# Patient Record
Sex: FEMALE | Race: BLACK OR AFRICAN AMERICAN | NOT HISPANIC OR LATINO | URBAN - METROPOLITAN AREA
[De-identification: names, ages, dates, MRNs, and addresses within clinical notes are randomized per-mention and may not be internally consistent; named-entity substitution may affect disease eponyms.]

---

## 2019-02-06 ENCOUNTER — EMERGENCY (EMERGENCY)
Facility: HOSPITAL | Age: 39
LOS: 1 days | Discharge: HOME | End: 2019-02-06
Attending: EMERGENCY MEDICINE | Admitting: EMERGENCY MEDICINE
Payer: MEDICAID

## 2019-02-06 VITALS
OXYGEN SATURATION: 100 % | DIASTOLIC BLOOD PRESSURE: 96 MMHG | SYSTOLIC BLOOD PRESSURE: 139 MMHG | HEART RATE: 88 BPM | TEMPERATURE: 98 F | RESPIRATION RATE: 16 BRPM

## 2019-02-06 PROCEDURE — 99283 EMERGENCY DEPT VISIT LOW MDM: CPT

## 2019-02-06 PROCEDURE — 71046 X-RAY EXAM CHEST 2 VIEWS: CPT | Mod: 26

## 2019-02-06 RX ORDER — IBUPROFEN 200 MG
600 TABLET ORAL ONCE
Qty: 0 | Refills: 0 | Status: COMPLETED | OUTPATIENT
Start: 2019-02-06 | End: 2019-02-06

## 2019-02-06 RX ADMIN — Medication 600 MILLIGRAM(S): at 16:39

## 2019-02-06 NOTE — ED ADULT TRIAGE NOTE - CHIEF COMPLAINT QUOTE
pt amb to triage c/o flu-like symptoms x 3 days, fever, chills, HA, weakness and body aches, received flu-shot this season, + sick contacts, mask placed on pt in triage

## 2019-02-06 NOTE — ED PROVIDER NOTE - OBJECTIVE STATEMENT
38F no pmh presents with fever/chills and cough since Saturday night. 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 Cough productive of yellow and white sputum. Denies abdominal pain, n/v, diarrhea, constipation, back pain, urinary symptoms, cp, sob. of note, 1 episode of bronchitis is the past.

## 2019-02-06 NOTE — ED PROVIDER NOTE - NSFOLLOWUPINSTRUCTIONS_ED_ALL_ED_FT
1) You were seen in the ER for cough. The patient has been informed of all concerning signs and symptoms to return to Emergency Department, the necessity to follow up with PMD/Clinic/follow up provided within 2-3 days was explained, and the patient reports understanding of above with capacity and insight. You can look at the discharge papers for some examples of specific signs and symptoms to look out for.  2. Please take tessalon perles for coughing.   3) Take motrin 600mg every 6 hours as needed for pain

## 2019-02-06 NOTE — ED PROVIDER NOTE - MEDICAL DECISION MAKING DETAILS
38F no pmh presents with fever/chills and cough since Saturday night. cxr to r/o pna, motrin for pain, poct pregnancy.

## 2019-02-06 NOTE — ED PROVIDER NOTE - NS ED ROS FT
Constitutional: no fevers, +chills.  Eyes: no visual changes.  Ears: no ear drainage, no ear pain.  Nose: no nasal congestion.  Mouth/Throat: no sore throat.  Cardiovascular: no chest pain.  Respiratory: no shortness of breath, no wheezing, +cough  Gastrointestinal: no nausea, no vomiting, no diarrhea, no abdominal pain.  MSK: no flank pain, no back pain.  Genitourinary: no dysuria, no hematuria.  Skin: no rashes.  Neuro: no headache,   Psychiatric: no known mental health issues.

## 2019-02-06 NOTE — ED PROVIDER NOTE - ATTENDING CONTRIBUTION TO CARE
DR. AMOR, ATTENDING MD-  I performed a face to face bedside interview with patient regarding history of present illness, review of symptoms and past medical history. I completed an independent physical exam.  I have discussed patient's plan of care with the resident.   Documentation as above in the note.    Den: c/o cough, myalgias for several days.  On exam, well appearing, comfortable, lungs clear, heart rrr, abd soft nt.  CXR clear.  A/P: likely viral URI, ibuprofen as needed for pain, fluids, outpt follow-up

## 2019-02-06 NOTE — ED PROVIDER NOTE - PHYSICAL EXAMINATION
GEN: Well appearing, well nourished, in no apparent distress.  HEAD: NCAT  HEENT: PERRL, Airway patent, EOMI, non-erythematous pharynx, no exudates, uvula midline, MMM, neck supple, no LAD, no JVD  LUNG: CTAB, no adventitious sounds, no retractions, no nasal flaring  CV: RRR, no murmurs,   Abd: soft, NTND, no rebound or guarding, BS+ in all quadrants,no CVAT  :  no inguinal bulging   MSK: WWP, Pulses 2+ in extremities, No edema   Neuro:  AAOx3, Ambulatory with stable gait.  Skin: Warm and dry, no evidence of rash  Psych: normal mood and affect

## 2019-07-17 ENCOUNTER — APPOINTMENT (OUTPATIENT)
Dept: DERMATOLOGY | Facility: CLINIC | Age: 39
End: 2019-07-17

## 2019-10-16 ENCOUNTER — INPATIENT (INPATIENT)
Facility: HOSPITAL | Age: 39
LOS: 0 days | Discharge: HOME | DRG: 812 | End: 2019-10-17
Attending: HOSPITALIST | Admitting: HOSPITALIST
Payer: MEDICAID

## 2019-10-16 VITALS
SYSTOLIC BLOOD PRESSURE: 138 MMHG | HEART RATE: 108 BPM | WEIGHT: 125 LBS | DIASTOLIC BLOOD PRESSURE: 88 MMHG | RESPIRATION RATE: 20 BRPM | TEMPERATURE: 98 F | HEIGHT: 61 IN | OXYGEN SATURATION: 99 %

## 2019-10-16 DIAGNOSIS — D64.9 ANEMIA, UNSPECIFIED: ICD-10-CM

## 2019-10-16 LAB
ALBUMIN SERPL ELPH-MCNC: 3.9 G/DL — SIGNIFICANT CHANGE UP (ref 3.5–5)
ALP SERPL-CCNC: 59 U/L — SIGNIFICANT CHANGE UP (ref 40–120)
ALT FLD-CCNC: 21 U/L DA — SIGNIFICANT CHANGE UP (ref 10–60)
ANION GAP SERPL CALC-SCNC: 4 MMOL/L — LOW (ref 5–17)
ANISOCYTOSIS BLD QL: SIGNIFICANT CHANGE UP
APPEARANCE UR: CLEAR — SIGNIFICANT CHANGE UP
APTT BLD: 29 SEC — SIGNIFICANT CHANGE UP (ref 27.5–36.3)
AST SERPL-CCNC: 18 U/L — SIGNIFICANT CHANGE UP (ref 10–40)
BACTERIA # UR AUTO: ABNORMAL /HPF
BASOPHILS # BLD AUTO: 0.02 K/UL — SIGNIFICANT CHANGE UP (ref 0–0.2)
BASOPHILS NFR BLD AUTO: 0.5 % — SIGNIFICANT CHANGE UP (ref 0–2)
BILIRUB SERPL-MCNC: 0.7 MG/DL — SIGNIFICANT CHANGE UP (ref 0.2–1.2)
BILIRUB UR-MCNC: NEGATIVE — SIGNIFICANT CHANGE UP
BLD GP AB SCN SERPL QL: SIGNIFICANT CHANGE UP
BUN SERPL-MCNC: 7 MG/DL — SIGNIFICANT CHANGE UP (ref 7–18)
CALCIUM SERPL-MCNC: 9 MG/DL — SIGNIFICANT CHANGE UP (ref 8.4–10.5)
CHLORIDE SERPL-SCNC: 107 MMOL/L — SIGNIFICANT CHANGE UP (ref 96–108)
CO2 SERPL-SCNC: 27 MMOL/L — SIGNIFICANT CHANGE UP (ref 22–31)
COLOR SPEC: YELLOW — SIGNIFICANT CHANGE UP
CREAT SERPL-MCNC: 0.7 MG/DL — SIGNIFICANT CHANGE UP (ref 0.5–1.3)
DIFF PNL FLD: NEGATIVE — SIGNIFICANT CHANGE UP
ELLIPTOCYTES BLD QL SMEAR: SLIGHT — SIGNIFICANT CHANGE UP
EOSINOPHIL # BLD AUTO: 0.02 K/UL — SIGNIFICANT CHANGE UP (ref 0–0.5)
EOSINOPHIL NFR BLD AUTO: 0.5 % — SIGNIFICANT CHANGE UP (ref 0–6)
EPI CELLS # UR: SIGNIFICANT CHANGE UP /HPF
GLUCOSE SERPL-MCNC: 88 MG/DL — SIGNIFICANT CHANGE UP (ref 70–99)
GLUCOSE UR QL: NEGATIVE — SIGNIFICANT CHANGE UP
HCG UR QL: NEGATIVE — SIGNIFICANT CHANGE UP
HCT VFR BLD CALC: 25.8 % — LOW (ref 34.5–45)
HGB BLD-MCNC: 7.3 G/DL — LOW (ref 11.5–15.5)
HYPOCHROMIA BLD QL: SIGNIFICANT CHANGE UP
IMM GRANULOCYTES NFR BLD AUTO: 0.2 % — SIGNIFICANT CHANGE UP (ref 0–1.5)
INR BLD: 1.03 RATIO — SIGNIFICANT CHANGE UP (ref 0.88–1.16)
KETONES UR-MCNC: NEGATIVE — SIGNIFICANT CHANGE UP
LEUKOCYTE ESTERASE UR-ACNC: ABNORMAL
LYMPHOCYTES # BLD AUTO: 1.34 K/UL — SIGNIFICANT CHANGE UP (ref 1–3.3)
LYMPHOCYTES # BLD AUTO: 31.5 % — SIGNIFICANT CHANGE UP (ref 13–44)
MANUAL SMEAR VERIFICATION: SIGNIFICANT CHANGE UP
MCHC RBC-ENTMCNC: 17.1 PG — LOW (ref 27–34)
MCHC RBC-ENTMCNC: 28.3 GM/DL — LOW (ref 32–36)
MCV RBC AUTO: 60.4 FL — LOW (ref 80–100)
MICROCYTES BLD QL: SIGNIFICANT CHANGE UP
MONOCYTES # BLD AUTO: 0.42 K/UL — SIGNIFICANT CHANGE UP (ref 0–0.9)
MONOCYTES NFR BLD AUTO: 9.9 % — SIGNIFICANT CHANGE UP (ref 2–14)
NEUTROPHILS # BLD AUTO: 2.45 K/UL — SIGNIFICANT CHANGE UP (ref 1.8–7.4)
NEUTROPHILS NFR BLD AUTO: 57.4 % — SIGNIFICANT CHANGE UP (ref 43–77)
NITRITE UR-MCNC: NEGATIVE — SIGNIFICANT CHANGE UP
NRBC # BLD: 0 /100 WBCS — SIGNIFICANT CHANGE UP (ref 0–0)
OVALOCYTES BLD QL SMEAR: SIGNIFICANT CHANGE UP
PH UR: 8 — SIGNIFICANT CHANGE UP (ref 5–8)
PLAT MORPH BLD: NORMAL — SIGNIFICANT CHANGE UP
PLATELET # BLD AUTO: 398 K/UL — SIGNIFICANT CHANGE UP (ref 150–400)
POIKILOCYTOSIS BLD QL AUTO: SIGNIFICANT CHANGE UP
POTASSIUM SERPL-MCNC: 4 MMOL/L — SIGNIFICANT CHANGE UP (ref 3.5–5.3)
POTASSIUM SERPL-SCNC: 4 MMOL/L — SIGNIFICANT CHANGE UP (ref 3.5–5.3)
PROT SERPL-MCNC: 8.3 G/DL — SIGNIFICANT CHANGE UP (ref 6–8.3)
PROT UR-MCNC: NEGATIVE — SIGNIFICANT CHANGE UP
PROTHROM AB SERPL-ACNC: 11.5 SEC — SIGNIFICANT CHANGE UP (ref 10–12.9)
RBC # BLD: 4.27 M/UL — SIGNIFICANT CHANGE UP (ref 3.8–5.2)
RBC # FLD: 21.5 % — HIGH (ref 10.3–14.5)
RBC BLD AUTO: ABNORMAL
RBC CASTS # UR COMP ASSIST: SIGNIFICANT CHANGE UP /HPF (ref 0–2)
SCHISTOCYTES BLD QL AUTO: SIGNIFICANT CHANGE UP
SODIUM SERPL-SCNC: 138 MMOL/L — SIGNIFICANT CHANGE UP (ref 135–145)
SP GR SPEC: 1.01 — SIGNIFICANT CHANGE UP (ref 1.01–1.02)
UROBILINOGEN FLD QL: NEGATIVE — SIGNIFICANT CHANGE UP
WBC # BLD: 4.26 K/UL — SIGNIFICANT CHANGE UP (ref 3.8–10.5)
WBC # FLD AUTO: 4.26 K/UL — SIGNIFICANT CHANGE UP (ref 3.8–10.5)
WBC UR QL: SIGNIFICANT CHANGE UP /HPF (ref 0–5)

## 2019-10-16 PROCEDURE — 99285 EMERGENCY DEPT VISIT HI MDM: CPT

## 2019-10-16 PROCEDURE — 99223 1ST HOSP IP/OBS HIGH 75: CPT

## 2019-10-16 NOTE — H&P ADULT - PROBLEM SELECTOR PLAN 2
IMPROVE VTE Individual Risk Assessment    RISK                                                                Points  [  ] Previous VTE                                                  3  [  ] Thrombophilia                                               2  [  ] Lower limb paralysis                                      2        (unable to hold up >15 seconds)    [  ] Current Cancer                                              2         (within 6 months)  [x ] Immobilization > 24 hrs                                1  [  ] ICU/CCU stay > 24 hours                              1  [  ] Age > 60                                                      1  IMPROVE VTE Score __1 no _DVT ppx is needed.  )

## 2019-10-16 NOTE — H&P ADULT - NSHPREVIEWOFSYSTEMS_GEN_ALL_CORE
REVIEW OF SYSTEMS:    CONSTITUTIONAL:  weakness, no  fevers or chills  EYES/ENT: No visual changes;  No vertigo or throat pain   NECK: No pain or stiffness  RESPIRATORY: No cough, wheezing, hemoptysis; Shortness of breath is present   CARDIOVASCULAR: No chest pain or palpitations  GASTROINTESTINAL: No abdominal or epigastric pain. No nausea, vomiting, or hematemesis; No diarrhea or constipation. No melena or hematochezia.  GENITOURINARY: No dysuria, frequency or hematuria  NEUROLOGICAL: No numbness or weakness  SKIN: No itching, burning, rashes, or lesions   All other review of systems is negative unless indicated above.

## 2019-10-16 NOTE — ED PROVIDER NOTE - OBJECTIVE STATEMENT
40 y/o female with no significant PMHx and no significant PSHx presents to the ER c/o episodes of dizziness on exertion and shortness of breath with exertion associated with vaginal bleeding as per patient  states that she went through 3-5 pads in a day. 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 Patient denies any other acute complaints. NKDA.

## 2019-10-16 NOTE — H&P ADULT - ASSESSMENT
38 y/o F from home with no PMHx of sickle cell trait presents to the ER c/o dizziness since 10/9. Pt states she had multiple episodes of Dizziness on exertion like taking hot shower, washing dishes, climbing stairs. It feels like head is spinning. It is a/w sob on exertion, nausea, decreased exercise tolerance, Blurring of vision, low energy, Tiredness, Fatigue, loss of appetite, shaking, not eating well and sleeping a lot. She is having black colored stools but on iron supplementation. She has her appointment with her PMD tomorrow. Pt had her LMP on 10/3, for 5 days, medium flow, no blood clots. Patient denies cough, sputum production, fevers/chills/vomiting,  diarrhea, abdominal pain, palpitations. Pt is admitted to medicine for Symptomatic anemia.

## 2019-10-16 NOTE — H&P ADULT - NSHPLABSRESULTS_GEN_ALL_CORE
LABS:                        7.3    4.26  )-----------( 398      ( 16 Oct 2019 21:26 )             25.8     10-16    138  |  107  |  7   ----------------------------<  88  4.0   |  27  |  0.70    Ca    9.0      16 Oct 2019 21:26    TPro  8.3  /  Alb  3.9  /  TBili  0.7  /  DBili  x   /  AST  18  /  ALT  21  /  AlkPhos  59  10-16    PT/INR - ( 16 Oct 2019 21:26 )   PT: 11.5 sec;   INR: 1.03 ratio         PTT - ( 16 Oct 2019 21:26 )  PTT:29.0 sec  Urinalysis Basic - ( 16 Oct 2019 21:26 )    Color: Yellow / Appearance: Clear / S.010 / pH: x  Gluc: x / Ketone: Negative  / Bili: Negative / Urobili: Negative   Blood: x / Protein: Negative / Nitrite: Negative   Leuk Esterase: Trace / RBC: 0-2 /HPF / WBC 3-5 /HPF   Sq Epi: x / Non Sq Epi: Few /HPF / Bacteria: Trace /HPF      LIVER FUNCTIONS - ( 16 Oct 2019 21:26 )  Alb: 3.9 g/dL / Pro: 8.3 g/dL / ALK PHOS: 59 U/L / ALT: 21 U/L DA / AST: 18 U/L / GGT: x

## 2019-10-16 NOTE — H&P ADULT - ATTENDING COMMENTS
Vital Signs Last 24 Hrs  T(C): 36.6 (16 Oct 2019 19:26), Max: 36.6 (16 Oct 2019 19:26)  T(F): 97.8 (16 Oct 2019 19:26), Max: 97.8 (16 Oct 2019 19:26)  HR: 108 (16 Oct 2019 19:26) (108 - 108)  BP: 138/88 (16 Oct 2019 19:26) (138/88 - 138/88)  BP(mean): --  RR: 20 (16 Oct 2019 19:26) (20 - 20)  SpO2: 99% (16 Oct 2019 19:26) (99% - 99%) Patient seen and examined ; case was discussed with the admitting resident    ROS: as in the HPI; all other ROS negative    SH and family history as above    Vital Signs Last 24 Hrs  T(C): 36.9 (16 Oct 2019 22:59), Max: 36.9 (16 Oct 2019 22:59)  T(F): 98.4 (16 Oct 2019 22:59), Max: 98.4 (16 Oct 2019 22:59)  HR: 102 (16 Oct 2019 22:59) (102 - 108)  BP: 130/70 (16 Oct 2019 22:59) (130/70 - 138/88)  BP(mean): --  RR: 18 (16 Oct 2019 22:59) (18 - 20)  SpO2: 100% (16 Oct 2019 22:59) (99% - 100%)    GEN: NAD  HEENT- normocephalic; mouth moist  CVS- S1S2+, Tachycardia   LUNGS- clear to auscultation; no wheezing  ABD: Soft , nontender, nondistended, Bowel sounds are present  EXTREMITY: no calf tenderness, no cyanosis, no edema  NEURO: AAOx3; non focal neurologic exam; cranial nerves grossly intact  PSYCH: normal affect and behavior  BACK: no swelling or mass;   VASCULAR: ++ distal peripheral pulses  SKIN: warm and dry.       Labs Reviewed:                         7.3    4.26  )-----------( 398      ( 16 Oct 2019 21:26 )             25.8     10-16    138  |  107  |  7   ----------------------------<  88  4.0   |  27  |  0.70    Ca    9.0      16 Oct 2019 21:26    TPro  8.3  /  Alb  3.9  /  TBili  0.7  /  DBili  x   /  AST  18  /  ALT  21  /  AlkPhos  59  10-16        Urinalysis Basic - ( 16 Oct 2019 21:26 )    Color: Yellow / Appearance: Clear / S.010 / pH: x  Gluc: x / Ketone: Negative  / Bili: Negative / Urobili: Negative   Blood: x / Protein: Negative / Nitrite: Negative   Leuk Esterase: Trace / RBC: 0-2 /HPF / WBC 3-5 /HPF   Sq Epi: x / Non Sq Epi: Few /HPF / Bacteria: Trace /HPF    38 y/o F without significant pmhx except known sickle cell trait, anemia, admitted with symptomatic anemia without overt blood loss. Patient denies menorrhagia, dysmenorrhea and had pelvic us within the last 6 months which was negative and she denies h/o fibroids. She is hesitant to receive blood transfusion at this time and wants to try iv iron.     1. Symptomatic anemia- t/s, anemia w/u including hgb electrophoresis, iron panel prior to IV iron infusion, check orthostatics, reassess patient's willingness to receive pRBC tx in AM.     Plan of care discussed with patient ;  all questions and concerns were addressed. Patient seen and examined ; case was discussed with the admitting resident    ROS: as in the HPI; all other ROS negative    SH and family history as above    Vital Signs Last 24 Hrs  T(C): 36.9 (16 Oct 2019 22:59), Max: 36.9 (16 Oct 2019 22:59)  T(F): 98.4 (16 Oct 2019 22:59), Max: 98.4 (16 Oct 2019 22:59)  HR: 102 (16 Oct 2019 22:59) (102 - 108)  BP: 130/70 (16 Oct 2019 22:59) (130/70 - 138/88)  BP(mean): --  RR: 18 (16 Oct 2019 22:59) (18 - 20)  SpO2: 100% (16 Oct 2019 22:59) (99% - 100%)    GEN: NAD  HEENT- normocephalic; mouth moist  CVS- S1S2+, Tachycardia   LUNGS- clear to auscultation; no wheezing  ABD: Soft , nontender, nondistended, Bowel sounds are present  EXTREMITY: no calf tenderness, no cyanosis, no edema  NEURO: AAOx3; non focal neurologic exam; cranial nerves grossly intact  PSYCH: normal affect and behavior  BACK: no swelling or mass;   VASCULAR: ++ distal peripheral pulses  SKIN: warm and dry.       Labs Reviewed:                         7.3    4.26  )-----------( 398      ( 16 Oct 2019 21:26 )             25.8     10-16    138  |  107  |  7   ----------------------------<  88  4.0   |  27  |  0.70    Ca    9.0      16 Oct 2019 21:26    TPro  8.3  /  Alb  3.9  /  TBili  0.7  /  DBili  x   /  AST  18  /  ALT  21  /  AlkPhos  59  10-16        Urinalysis Basic - ( 16 Oct 2019 21:26 )    Color: Yellow / Appearance: Clear / S.010 / pH: x  Gluc: x / Ketone: Negative  / Bili: Negative / Urobili: Negative   Blood: x / Protein: Negative / Nitrite: Negative   Leuk Esterase: Trace / RBC: 0-2 /HPF / WBC 3-5 /HPF   Sq Epi: x / Non Sq Epi: Few /HPF / Bacteria: Trace /HPF    40 y/o F without significant pmhx except known sickle cell trait, anemia, admitted with symptomatic anemia without overt blood loss. Patient denies menorrhagia, dysmenorrhea and had pelvic us within the last 6 months which was negative and she denies h/o fibroids. She is hesitant to receive blood transfusion at this time and wants to try iv iron.     1. Symptomatic anemia- t/s, anemia w/u including hgb electrophoresis, iron panel prior to IV iron infusion, check orthostatics, reassess patient's willingness to receive pRBC tx in AM.   2. Mircocytic anemia    Plan of care discussed with patient ;  all questions and concerns were addressed.

## 2019-10-16 NOTE — H&P ADULT - PROBLEM SELECTOR PLAN 1
Pt with Hb 7.3/ 25.8 with MCV of 60 came with complaint of Dizziness, dyspnea, SOB, Black stools per rectum.  DDx Iron deficiency due to blood loss and nutritional deficiency vs Sickle cell anemia vs Thalassemia vs Hemolytic disorders.  Baseline H/H is 10.3/31.3 with MCV 76.6 as of 2017  f/u FOBT  f/u iron studies  f/u Hb Electrophoresis  f/u LDH, Haptoglobin, Reticulocyte count.  c/w iron supplementation  Pt refused Blood transfusion and consent was not given and would like to try venofer first and see if it improves. Pt with Hb 7.3/ 25.8 with MCV of 60 came with complaint of Dizziness, dyspnea, SOB, Black stools per rectum.  DDx Iron deficiency due to blood loss and nutritional deficiency vs Sickle cell anemia vs Thalassemia vs Hemolytic disorders.  Baseline H/H is 10.3/31.3 with MCV 76.6 as of 2017  f/u FOBT  f/u iron studies  f/u Hb Electrophoresis  f/u LDH, Haptoglobin, Reticulocyte count.  c/w iron supplementation  Will give one unit of Blood transfusion   f/U Repeat CBC. Pt with Hb 7.3/ 25.8 with MCV of 60 came with complaint of Dizziness, dyspnea, SOB, Black stools per rectum.  DDx Iron deficiency due to blood loss and nutritional deficiency vs Sickle cell anemia vs Thalassemia vs Hemolytic disorders.  Baseline H/H is 10.3/31.3 with MCV 76.6 as of 2017  f/u FOBT  f/u iron studies  f/u Hb Electrophoresis  f/u LDH, Haptoglobin, Reticulocyte count.  c/w iron supplementation  Will give one unit of Blood transfusion   f/U Repeat post transfusion CBC.  f/u Heme onc

## 2019-10-16 NOTE — H&P ADULT - HISTORY OF PRESENT ILLNESS
38 y/o F from home with no PMHx of sickle cell trait presents to the ER c/o dizziness since 10/9. Pt states she had multiple episodes of Dizziness on exertion like taking hot shower, washing dishes, climbing stairs. It feels like head is spinning. It is a/w sob on exertion, nausea, decreased exercise tolerance, Blurring of vision, low energy, Tiredness, Fatigue, loss of appetite, shaking, not eating well and sleeping a lot. She is having black colored stools but on iron supplementation. She has her appointment with her PMD tomorrow. Pt had her LMP on 10/3, for 5 days, medium flow, no blood clots. Patient denies cough, sputum production, fevers/chills/vomiting,  diarrhea, abdominal pain, palpitations.  GOC Full code

## 2019-10-16 NOTE — ED ADULT NURSE NOTE - OBJECTIVE STATEMENT
pt is here for shortness of breathing.  pt stated that lightheadedness, SOB, since Monday, denied chest pain or blurred vision, denied fever or chills, pt calm at this time.

## 2019-10-17 ENCOUNTER — TRANSCRIPTION ENCOUNTER (OUTPATIENT)
Age: 39
End: 2019-10-17

## 2019-10-17 VITALS
RESPIRATION RATE: 18 BRPM | DIASTOLIC BLOOD PRESSURE: 83 MMHG | HEART RATE: 87 BPM | TEMPERATURE: 98 F | SYSTOLIC BLOOD PRESSURE: 114 MMHG | OXYGEN SATURATION: 100 %

## 2019-10-17 DIAGNOSIS — Z29.9 ENCOUNTER FOR PROPHYLACTIC MEASURES, UNSPECIFIED: ICD-10-CM

## 2019-10-17 DIAGNOSIS — D64.9 ANEMIA, UNSPECIFIED: ICD-10-CM

## 2019-10-17 LAB
24R-OH-CALCIDIOL SERPL-MCNC: 21.7 NG/ML — LOW (ref 30–80)
ALBUMIN SERPL ELPH-MCNC: 3.5 G/DL — SIGNIFICANT CHANGE UP (ref 3.5–5)
ALP SERPL-CCNC: 50 U/L — SIGNIFICANT CHANGE UP (ref 40–120)
ALT FLD-CCNC: 17 U/L DA — SIGNIFICANT CHANGE UP (ref 10–60)
ANION GAP SERPL CALC-SCNC: 3 MMOL/L — LOW (ref 5–17)
AST SERPL-CCNC: 18 U/L — SIGNIFICANT CHANGE UP (ref 10–40)
BASOPHILS # BLD AUTO: 0.01 K/UL — SIGNIFICANT CHANGE UP (ref 0–0.2)
BASOPHILS NFR BLD AUTO: 0.3 % — SIGNIFICANT CHANGE UP (ref 0–2)
BILIRUB SERPL-MCNC: 0.7 MG/DL — SIGNIFICANT CHANGE UP (ref 0.2–1.2)
BUN SERPL-MCNC: 6 MG/DL — LOW (ref 7–18)
CALCIUM SERPL-MCNC: 8.6 MG/DL — SIGNIFICANT CHANGE UP (ref 8.4–10.5)
CHLORIDE SERPL-SCNC: 111 MMOL/L — HIGH (ref 96–108)
CHOLEST SERPL-MCNC: 160 MG/DL — SIGNIFICANT CHANGE UP (ref 10–199)
CO2 SERPL-SCNC: 26 MMOL/L — SIGNIFICANT CHANGE UP (ref 22–31)
CREAT SERPL-MCNC: 0.68 MG/DL — SIGNIFICANT CHANGE UP (ref 0.5–1.3)
EOSINOPHIL # BLD AUTO: 0.04 K/UL — SIGNIFICANT CHANGE UP (ref 0–0.5)
EOSINOPHIL NFR BLD AUTO: 1.2 % — SIGNIFICANT CHANGE UP (ref 0–6)
FERRITIN SERPL-MCNC: 7 NG/ML — LOW (ref 15–150)
FOLATE SERPL-MCNC: 13.4 NG/ML — SIGNIFICANT CHANGE UP
GLUCOSE SERPL-MCNC: 87 MG/DL — SIGNIFICANT CHANGE UP (ref 70–99)
HAPTOGLOB SERPL-MCNC: 72 MG/DL — SIGNIFICANT CHANGE UP (ref 34–200)
HBA1C BLD-MCNC: 4.8 % — SIGNIFICANT CHANGE UP (ref 4–5.6)
HCT VFR BLD CALC: 24 % — LOW (ref 34.5–45)
HCT VFR BLD CALC: 28 % — LOW (ref 34.5–45)
HDLC SERPL-MCNC: 101 MG/DL — SIGNIFICANT CHANGE UP
HGB BLD-MCNC: 6.8 G/DL — CRITICAL LOW (ref 11.5–15.5)
HGB BLD-MCNC: 8.3 G/DL — LOW (ref 11.5–15.5)
IMM GRANULOCYTES NFR BLD AUTO: 0.3 % — SIGNIFICANT CHANGE UP (ref 0–1.5)
IRON SATN MFR SERPL: 14 UG/DL — LOW (ref 40–150)
IRON SATN MFR SERPL: 4 % — LOW (ref 15–50)
LDH SERPL L TO P-CCNC: 152 U/L — SIGNIFICANT CHANGE UP (ref 120–225)
LIPID PNL WITH DIRECT LDL SERPL: 52 MG/DL — SIGNIFICANT CHANGE UP
LYMPHOCYTES # BLD AUTO: 1.54 K/UL — SIGNIFICANT CHANGE UP (ref 1–3.3)
LYMPHOCYTES # BLD AUTO: 46.5 % — HIGH (ref 13–44)
MAGNESIUM SERPL-MCNC: 2.1 MG/DL — SIGNIFICANT CHANGE UP (ref 1.6–2.6)
MCHC RBC-ENTMCNC: 17.3 PG — LOW (ref 27–34)
MCHC RBC-ENTMCNC: 19.2 PG — LOW (ref 27–34)
MCHC RBC-ENTMCNC: 28.3 GM/DL — LOW (ref 32–36)
MCHC RBC-ENTMCNC: 29.6 GM/DL — LOW (ref 32–36)
MCV RBC AUTO: 61.1 FL — LOW (ref 80–100)
MCV RBC AUTO: 64.8 FL — LOW (ref 80–100)
MONOCYTES # BLD AUTO: 0.35 K/UL — SIGNIFICANT CHANGE UP (ref 0–0.9)
MONOCYTES NFR BLD AUTO: 10.6 % — SIGNIFICANT CHANGE UP (ref 2–14)
NEUTROPHILS # BLD AUTO: 1.36 K/UL — LOW (ref 1.8–7.4)
NEUTROPHILS NFR BLD AUTO: 41.1 % — LOW (ref 43–77)
NRBC # BLD: 0 /100 WBCS — SIGNIFICANT CHANGE UP (ref 0–0)
NRBC # BLD: 0 /100 WBCS — SIGNIFICANT CHANGE UP (ref 0–0)
PHOSPHATE SERPL-MCNC: 2.8 MG/DL — SIGNIFICANT CHANGE UP (ref 2.5–4.5)
PLATELET # BLD AUTO: 331 K/UL — SIGNIFICANT CHANGE UP (ref 150–400)
PLATELET # BLD AUTO: 387 K/UL — SIGNIFICANT CHANGE UP (ref 150–400)
POTASSIUM SERPL-MCNC: 4 MMOL/L — SIGNIFICANT CHANGE UP (ref 3.5–5.3)
POTASSIUM SERPL-SCNC: 4 MMOL/L — SIGNIFICANT CHANGE UP (ref 3.5–5.3)
PROT SERPL-MCNC: 7.3 G/DL — SIGNIFICANT CHANGE UP (ref 6–8.3)
RBC # BLD: 3.87 M/UL — SIGNIFICANT CHANGE UP (ref 3.8–5.2)
RBC # BLD: 3.93 M/UL — SIGNIFICANT CHANGE UP (ref 3.8–5.2)
RBC # BLD: 4.32 M/UL — SIGNIFICANT CHANGE UP (ref 3.8–5.2)
RBC # FLD: 21.8 % — HIGH (ref 10.3–14.5)
RBC # FLD: 26.2 % — HIGH (ref 10.3–14.5)
RETICS #: 58.8 K/UL — SIGNIFICANT CHANGE UP (ref 25–125)
RETICS/RBC NFR: 1.5 % — SIGNIFICANT CHANGE UP (ref 0.5–2.5)
SODIUM SERPL-SCNC: 140 MMOL/L — SIGNIFICANT CHANGE UP (ref 135–145)
TIBC SERPL-MCNC: 369 UG/DL — SIGNIFICANT CHANGE UP (ref 250–450)
TOTAL CHOLESTEROL/HDL RATIO MEASUREMENT: 1.6 RATIO — LOW (ref 3.3–7.1)
TRANSFERRIN SERPL-MCNC: 288 MG/DL — SIGNIFICANT CHANGE UP (ref 200–360)
TRIGL SERPL-MCNC: 36 MG/DL — SIGNIFICANT CHANGE UP (ref 10–149)
TSH SERPL-MCNC: 2.38 UU/ML — SIGNIFICANT CHANGE UP (ref 0.34–4.82)
UIBC SERPL-MCNC: 355 UG/DL — SIGNIFICANT CHANGE UP (ref 110–370)
VIT B12 SERPL-MCNC: 692 PG/ML — SIGNIFICANT CHANGE UP (ref 232–1245)
WBC # BLD: 3.31 K/UL — LOW (ref 3.8–10.5)
WBC # BLD: 3.64 K/UL — LOW (ref 3.8–10.5)
WBC # FLD AUTO: 3.31 K/UL — LOW (ref 3.8–10.5)
WBC # FLD AUTO: 3.64 K/UL — LOW (ref 3.8–10.5)

## 2019-10-17 PROCEDURE — 84466 ASSAY OF TRANSFERRIN: CPT

## 2019-10-17 PROCEDURE — 93005 ELECTROCARDIOGRAM TRACING: CPT

## 2019-10-17 PROCEDURE — 83735 ASSAY OF MAGNESIUM: CPT

## 2019-10-17 PROCEDURE — 82962 GLUCOSE BLOOD TEST: CPT

## 2019-10-17 PROCEDURE — 82607 VITAMIN B-12: CPT

## 2019-10-17 PROCEDURE — 81001 URINALYSIS AUTO W/SCOPE: CPT

## 2019-10-17 PROCEDURE — 85027 COMPLETE CBC AUTOMATED: CPT

## 2019-10-17 PROCEDURE — 83036 HEMOGLOBIN GLYCOSYLATED A1C: CPT

## 2019-10-17 PROCEDURE — 83550 IRON BINDING TEST: CPT

## 2019-10-17 PROCEDURE — 83540 ASSAY OF IRON: CPT

## 2019-10-17 PROCEDURE — 90686 IIV4 VACC NO PRSV 0.5 ML IM: CPT

## 2019-10-17 PROCEDURE — 99285 EMERGENCY DEPT VISIT HI MDM: CPT | Mod: 25

## 2019-10-17 PROCEDURE — 83010 ASSAY OF HAPTOGLOBIN QUANT: CPT

## 2019-10-17 PROCEDURE — 86900 BLOOD TYPING SEROLOGIC ABO: CPT

## 2019-10-17 PROCEDURE — 80053 COMPREHEN METABOLIC PANEL: CPT

## 2019-10-17 PROCEDURE — 36430 TRANSFUSION BLD/BLD COMPNT: CPT

## 2019-10-17 PROCEDURE — 86901 BLOOD TYPING SEROLOGIC RH(D): CPT

## 2019-10-17 PROCEDURE — P9040: CPT

## 2019-10-17 PROCEDURE — 84100 ASSAY OF PHOSPHORUS: CPT

## 2019-10-17 PROCEDURE — 83020 HEMOGLOBIN ELECTROPHORESIS: CPT | Mod: 26

## 2019-10-17 PROCEDURE — 82746 ASSAY OF FOLIC ACID SERUM: CPT

## 2019-10-17 PROCEDURE — 84443 ASSAY THYROID STIM HORMONE: CPT

## 2019-10-17 PROCEDURE — 82728 ASSAY OF FERRITIN: CPT

## 2019-10-17 PROCEDURE — 85045 AUTOMATED RETICULOCYTE COUNT: CPT

## 2019-10-17 PROCEDURE — 36415 COLL VENOUS BLD VENIPUNCTURE: CPT

## 2019-10-17 PROCEDURE — 83020 HEMOGLOBIN ELECTROPHORESIS: CPT

## 2019-10-17 PROCEDURE — 85730 THROMBOPLASTIN TIME PARTIAL: CPT

## 2019-10-17 PROCEDURE — 85610 PROTHROMBIN TIME: CPT

## 2019-10-17 PROCEDURE — 81025 URINE PREGNANCY TEST: CPT

## 2019-10-17 PROCEDURE — 82306 VITAMIN D 25 HYDROXY: CPT

## 2019-10-17 PROCEDURE — 86850 RBC ANTIBODY SCREEN: CPT

## 2019-10-17 PROCEDURE — 80061 LIPID PANEL: CPT

## 2019-10-17 PROCEDURE — 83615 LACTATE (LD) (LDH) ENZYME: CPT

## 2019-10-17 PROCEDURE — 86923 COMPATIBILITY TEST ELECTRIC: CPT

## 2019-10-17 RX ORDER — CHOLECALCIFEROL (VITAMIN D3) 125 MCG
1 CAPSULE ORAL
Qty: 30 | Refills: 0
Start: 2019-10-17 | End: 2019-11-15

## 2019-10-17 RX ORDER — IRON SUCROSE 20 MG/ML
200 INJECTION, SOLUTION INTRAVENOUS ONCE
Refills: 0 | Status: COMPLETED | OUTPATIENT
Start: 2019-10-17 | End: 2019-10-17

## 2019-10-17 RX ORDER — INFLUENZA VIRUS VACCINE 15; 15; 15; 15 UG/.5ML; UG/.5ML; UG/.5ML; UG/.5ML
0.5 SUSPENSION INTRAMUSCULAR ONCE
Refills: 0 | Status: COMPLETED | OUTPATIENT
Start: 2019-10-17 | End: 2019-10-17

## 2019-10-17 RX ORDER — ACETAMINOPHEN 500 MG
650 TABLET ORAL ONCE
Refills: 0 | Status: COMPLETED | OUTPATIENT
Start: 2019-10-17 | End: 2019-10-17

## 2019-10-17 RX ORDER — FERROUS SULFATE 325(65) MG
325 TABLET ORAL DAILY
Refills: 0 | Status: DISCONTINUED | OUTPATIENT
Start: 2019-10-17 | End: 2019-10-17

## 2019-10-17 RX ORDER — FERROUS SULFATE 325(65) MG
1 TABLET ORAL
Qty: 1 | Refills: 0
Start: 2019-10-17 | End: 2019-11-15

## 2019-10-17 RX ADMIN — Medication 325 MILLIGRAM(S): at 11:11

## 2019-10-17 RX ADMIN — Medication 650 MILLIGRAM(S): at 12:02

## 2019-10-17 RX ADMIN — IRON SUCROSE 110 MILLIGRAM(S): 20 INJECTION, SOLUTION INTRAVENOUS at 17:39

## 2019-10-17 RX ADMIN — Medication 650 MILLIGRAM(S): at 11:12

## 2019-10-17 RX ADMIN — INFLUENZA VIRUS VACCINE 0.5 MILLILITER(S): 15; 15; 15; 15 SUSPENSION INTRAMUSCULAR at 19:03

## 2019-10-17 RX ADMIN — Medication 1 TABLET(S): at 11:11

## 2019-10-17 NOTE — DISCHARGE NOTE PROVIDER - CARE PROVIDER_API CALL
Xu Alexandra (MD)  Hematology; Oncology  [address], [address]  [place] [address]  Phone: [phone]  Fax: [phone]  Follow Up Time:

## 2019-10-17 NOTE — DISCHARGE NOTE PROVIDER - HOSPITAL COURSE
38 y/o female  no significant pmhx  except sickle cell trait presented with dizziness, fatigue, ROSAS, found to have Hg 6.8 admitted for symptomatic anemia. Reports normal menstruation, no hx of uterine fibroids, normal recent pelvic US. Baseline Hg is 10, on iron supplements at home.  Denies bleeding, abdominal pain, blood in stool.   Pt received one Unit of PRBC and Venofer 200 mg IV     Repeat hemoglobin is 8.3/24     patient verbalizes feeling better, anxious to go home     Pt cleared to be discharge home with recommendation to follow up with PCP and hematology for further evaluation

## 2019-10-17 NOTE — DISCHARGE NOTE NURSING/CASE MANAGEMENT/SOCIAL WORK - PATIENT PORTAL LINK FT
You can access the FollowMyHealth Patient Portal offered by [facility] by registering at the following website: [email or web address]. By joining [facility]’s FollowMyHealth portal, you will also be able to view your health information using other applications (apps) compatible with our system.

## 2019-10-17 NOTE — PROGRESS NOTE ADULT - PROBLEM SELECTOR PLAN 1
Mircocytic anemia, under evaluation for  Iron deficiency vs Sickle cell anemia vs Thalassemia vs hemolytic disorders   f/u FOBT  f/u anemia panel   f/u Hemoglobin Electrophoresis  continue iron supplementation  transfuse PRBC one unit   f/u post transfusion CBC   orthostatic vital signs   hematology consulted Dr Eubanks

## 2019-10-17 NOTE — PROGRESS NOTE ADULT - SUBJECTIVE AND OBJECTIVE BOX
NP Note   38 y/o female  from no significant pmhx  except sickle cell trait presented with dizziness, fatigue, ROSAS, found to have Hg 6.8 admitted for symptomatic anemia. Reports normal menstruation, no hx of uterine fibroids, normal recent pelvic US. Baseline Hg is 10, on iron supplements at home.  Denies bleeding, abdominal pain, blood in stool.  Seen at bedside alert, awake,  denies chest pain or SOB at rest. Unit of PRBC in progress. Hematology consulted and pending evaluation         INTERVAL HPI/OVERNIGHT EVENTS: no new complaints    MEDICATIONS  (STANDING):  ferrous    sulfate 325 milliGRAM(s) Oral daily  multivitamin 1 Tablet(s) Oral daily    MEDICATIONS  (PRN):      __________________________________________________  REVIEW OF SYSTEMS:    CONSTITUTIONAL: No fever,   RESPIRATORY: No cough; +ROSAS   CARDIOVASCULAR: No chest pain, no palpitations  GASTROINTESTINAL: No pain. No nausea or vomiting; No diarrhea   NEUROLOGICAL: No headache or numbness, no tremors'   MUSCULOSKELETAL: No joint pain, no muscle pain  GENITOURINARY: no dysuria, no frequency, no hesitancy  PSYCHIATRY: no depression , no anxiety  ALL OTHER  ROS negative        Vital Signs Last 24 Hrs  T(C): 37 (17 Oct 2019 08:00), Max: 37 (17 Oct 2019 08:00)  T(F): 98.6 (17 Oct 2019 08:00), Max: 98.6 (17 Oct 2019 08:00)  HR: 82 (17 Oct 2019 08:00) (80 - 108)  BP: 132/67 (17 Oct 2019 08:00) (112/67 - 138/88)  BP(mean): --  RR: 16 (17 Oct 2019 08:00) (16 - 20)  SpO2: 100% (17 Oct 2019 08:00) (98% - 100%)    ________________________________________________  PHYSICAL EXAM:  GENERAL: NAD  CHEST/LUNG: Clear to ausculitation lewis  HEART: S1 S2  regular; no murmurs, gallops or rubs  ABDOMEN: Soft, Nontender, Nondistended; Bowel sounds present  EXTREMITIES: no cyanosis; no edema; no calf tenderness  SKIN: warm and dry; no rash  NERVOUS SYSTEM:  Awake and alert; Oriented  to place, person and time ; no new deficits    _________________________________________________  LABS:                        6.8    3.31  )-----------( 387      ( 17 Oct 2019 05:50 )             24.0     10-17    140  |  111<H>  |  6<L>  ----------------------------<  87  4.0   |  26  |  0.68    Ca    8.6      17 Oct 2019 05:50  Phos  2.8     10-  Mg     2.1     10-    TPro  7.3  /  Alb  3.5  /  TBili  0.7  /  DBili  x   /  AST  18  /  ALT  17  /  AlkPhos  50  10-17    PT/INR - ( 16 Oct 2019 21:26 )   PT: 11.5 sec;   INR: 1.03 ratio         PTT - ( 16 Oct 2019 21:26 )  PTT:29.0 sec  Urinalysis Basic - ( 16 Oct 2019 21:26 )    Color: Yellow / Appearance: Clear / S.010 / pH: x  Gluc: x / Ketone: Negative  / Bili: Negative / Urobili: Negative   Blood: x / Protein: Negative / Nitrite: Negative   Leuk Esterase: Trace / RBC: 0-2 /HPF / WBC 3-5 /HPF   Sq Epi: x / Non Sq Epi: Few /HPF / Bacteria: Trace /HPF      CAPILLARY BLOOD GLUCOSE      POCT Blood Glucose.: 90 mg/dL (16 Oct 2019 20:12)        RADIOLOGY & ADDITIONAL TESTS:    Imaging Personally Reviewed:  YES/NO    Consultant(s) Notes Reviewed:   YES/ No    Care Discussed with Consultants :     Plan of care was discussed with patient and /or primary care giver; all questions and concerns were addressed and care was aligned with patient's wishes.

## 2019-10-17 NOTE — DISCHARGE NOTE PROVIDER - NSDCCPCAREPLAN_GEN_ALL_CORE_FT
PRINCIPAL DISCHARGE DIAGNOSIS  Diagnosis: Symptomatic anemia  Assessment and Plan of Treatment: You were admitted to the hospital for symptomatic anemia due to iron deficiency. You received one unit of red blood cells and iron intravenously.  At discharge your hemoglobin was 8.3.   Please continue iron supplements at home   Follow up with your doctor for reevalauiton within one week   Please follow up with hematologist for further evalaution to investigate the causea of your anemia.   The number to [facility]  Hematologist has been provided below:   [phone]  The office is open Monday to Friday from 9am-5pm      SECONDARY DISCHARGE DIAGNOSES  Diagnosis: Low serum vitamin D  Assessment and Plan of Treatment: You vitamin level is low   Please take vitamin D supplement and follow up with your doctor for further management

## 2019-10-17 NOTE — DISCHARGE NOTE PROVIDER - NSDCPNSUBOBJ_GEN_ALL_CORE
MEDICAL ATTENDING DISCHARGE NOTE :        Patient is a 39y old  Female who presents with a chief complaint of Dizziness (17 Oct 2019 18:22)            INTERVAL HPI / OVERNIGHT EVENTS: patient with uneventful night and offers no new complaints        ----------------------------------------------------------------------------------    REVIEW OF SYSTEMS: no fever; no SOB            Vital Signs Last 24 Hrs    T(C): 36.7 (17 Oct 2019 15:51), Max: 37 (17 Oct 2019 08:00)    T(F): 98.1 (17 Oct 2019 15:51), Max: 98.6 (17 Oct 2019 08:00)    HR: 87 (17 Oct 2019 15:51) (77 - 102)    BP: 114/83 (17 Oct 2019 15:51) (112/67 - 132/67)    BP(mean): --    RR: 18 (17 Oct 2019 15:51) (16 - 18)    SpO2: 100% (17 Oct 2019 15:51) (98% - 100%)        _________________    PHYSICAL EXAM:    ---------------------------     NAD; Normocephalic    LUNGS - no wheezing    HEART: S1 S2+     ABDOMEN: Soft, Nontender, non distended    EXTREMITIES: no cyanosis; no edema            _________________________________________________    LABS:                            8.3      3.64  )-----------( 331      ( 17 Oct 2019 14:02 )               28.0         10-17        140  |  111<H>  |  6<L>    ----------------------------<  87    4.0   |  26  |  0.68        Ca    8.6      17 Oct 2019 05:50    Phos  2.8     10-17    Mg     2.1     10-17        TPro  7.3  /  Alb  3.5  /  TBili  0.7  /  DBili  x   /  AST  18  /  ALT  17  /  AlkPhos  50  10-17        PT/INR - ( 16 Oct 2019 21:26 )   PT: 11.5 sec;   INR: 1.03 ratio               PTT - ( 16 Oct 2019 21:26 )  PTT:29.0 sec                A/P: 40 y/o F admitted for symptomatic anemia presumed due to menorrhagia related acute on chronic blood     - clinically improved post PRBC transfusion and IV Venofer.    Symptoms  resolved mostly    Outpatient hematology follow up    Iron supplements daily advised.    Discharged in stable medical condition    Outpatient PCP follow up advised.        Plan of care, test results and findings were  discussed with patient ( and HCP &/or primary care giver) ; all questions and concerns were addressed and care was aligned with patient's wishes.    PMD follow up after discharge from the hospital for continued care and outpatient monitoring was advised.    Discharge plans has been  discussed with care team including the housestaff, nursing staff  and discharge planners- ( /  .)

## 2019-10-18 LAB
HGB S BLD QL: POSITIVE
SOLUBILITY: POSITIVE

## 2019-10-21 LAB
HEMOGLOBIN INTERPRETATION: SIGNIFICANT CHANGE UP
HGB A MFR BLD: 64.3 % — LOW (ref 95.8–98)
HGB A2 MFR BLD: 2.8 % — SIGNIFICANT CHANGE UP (ref 2–3.2)
HGB S MFR BLD: 32.9 % — HIGH

## 2019-10-30 ENCOUNTER — TRANSCRIPTION ENCOUNTER (OUTPATIENT)
Age: 39
End: 2019-10-30

## 2020-04-26 ENCOUNTER — MESSAGE (OUTPATIENT)
Age: 40
End: 2020-04-26

## 2022-07-20 ENCOUNTER — EMERGENCY (EMERGENCY)
Facility: HOSPITAL | Age: 42
LOS: 1 days | Discharge: HOME | End: 2022-07-20
Attending: EMERGENCY MEDICINE
Payer: MEDICAID

## 2022-07-20 VITALS
RESPIRATION RATE: 17 BRPM | TEMPERATURE: 98 F | DIASTOLIC BLOOD PRESSURE: 73 MMHG | HEART RATE: 78 BPM | SYSTOLIC BLOOD PRESSURE: 114 MMHG | OXYGEN SATURATION: 100 %

## 2022-07-20 VITALS
HEART RATE: 108 BPM | HEIGHT: 61 IN | SYSTOLIC BLOOD PRESSURE: 111 MMHG | DIASTOLIC BLOOD PRESSURE: 75 MMHG | WEIGHT: 119.93 LBS | OXYGEN SATURATION: 100 % | RESPIRATION RATE: 18 BRPM | TEMPERATURE: 99 F

## 2022-07-20 LAB
ALBUMIN SERPL ELPH-MCNC: 4.4 G/DL — SIGNIFICANT CHANGE UP (ref 3.3–5)
ALP SERPL-CCNC: 52 U/L — SIGNIFICANT CHANGE UP (ref 40–120)
ALT FLD-CCNC: 8 U/L — LOW (ref 10–45)
ANION GAP SERPL CALC-SCNC: 10 MMOL/L — SIGNIFICANT CHANGE UP (ref 5–17)
ANISOCYTOSIS BLD QL: SIGNIFICANT CHANGE UP
APTT BLD: 27 SEC — LOW (ref 27.5–35.5)
AST SERPL-CCNC: 15 U/L — SIGNIFICANT CHANGE UP (ref 10–40)
BASOPHILS # BLD AUTO: 0.03 K/UL — SIGNIFICANT CHANGE UP (ref 0–0.2)
BASOPHILS NFR BLD AUTO: 0.9 % — SIGNIFICANT CHANGE UP (ref 0–2)
BILIRUB SERPL-MCNC: 0.4 MG/DL — SIGNIFICANT CHANGE UP (ref 0.2–1.2)
BLD GP AB SCN SERPL QL: NEGATIVE — SIGNIFICANT CHANGE UP
BUN SERPL-MCNC: 7 MG/DL — SIGNIFICANT CHANGE UP (ref 7–23)
CALCIUM SERPL-MCNC: 9 MG/DL — SIGNIFICANT CHANGE UP (ref 8.4–10.5)
CHLORIDE SERPL-SCNC: 107 MMOL/L — SIGNIFICANT CHANGE UP (ref 96–108)
CO2 SERPL-SCNC: 22 MMOL/L — SIGNIFICANT CHANGE UP (ref 22–31)
CREAT SERPL-MCNC: 0.64 MG/DL — SIGNIFICANT CHANGE UP (ref 0.5–1.3)
DACRYOCYTES BLD QL SMEAR: SLIGHT — SIGNIFICANT CHANGE UP
EGFR: 114 ML/MIN/1.73M2 — SIGNIFICANT CHANGE UP
ELLIPTOCYTES BLD QL SMEAR: SLIGHT — SIGNIFICANT CHANGE UP
EOSINOPHIL # BLD AUTO: 0.03 K/UL — SIGNIFICANT CHANGE UP (ref 0–0.5)
EOSINOPHIL NFR BLD AUTO: 0.9 % — SIGNIFICANT CHANGE UP (ref 0–6)
GIANT PLATELETS BLD QL SMEAR: PRESENT — SIGNIFICANT CHANGE UP
GLUCOSE SERPL-MCNC: 88 MG/DL — SIGNIFICANT CHANGE UP (ref 70–99)
HCG SERPL-ACNC: <2 MIU/ML — SIGNIFICANT CHANGE UP
HCT VFR BLD CALC: 25.3 % — LOW (ref 34.5–45)
HGB BLD-MCNC: 7.5 G/DL — LOW (ref 11.5–15.5)
HYPOCHROMIA BLD QL: SIGNIFICANT CHANGE UP
INR BLD: 1.03 RATIO — SIGNIFICANT CHANGE UP (ref 0.88–1.16)
LYMPHOCYTES # BLD AUTO: 0.52 K/UL — LOW (ref 1–3.3)
LYMPHOCYTES # BLD AUTO: 14 % — SIGNIFICANT CHANGE UP (ref 13–44)
MACROCYTES BLD QL: SLIGHT — SIGNIFICANT CHANGE UP
MANUAL SMEAR VERIFICATION: SIGNIFICANT CHANGE UP
MCHC RBC-ENTMCNC: 19.2 PG — LOW (ref 27–34)
MCHC RBC-ENTMCNC: 29.6 GM/DL — LOW (ref 32–36)
MCV RBC AUTO: 64.9 FL — LOW (ref 80–100)
MICROCYTES BLD QL: SIGNIFICANT CHANGE UP
MONOCYTES # BLD AUTO: 0.46 K/UL — SIGNIFICANT CHANGE UP (ref 0–0.9)
MONOCYTES NFR BLD AUTO: 12.3 % — SIGNIFICANT CHANGE UP (ref 2–14)
NEUTROPHILS # BLD AUTO: 2.68 K/UL — SIGNIFICANT CHANGE UP (ref 1.8–7.4)
NEUTROPHILS NFR BLD AUTO: 71.9 % — SIGNIFICANT CHANGE UP (ref 43–77)
PLAT MORPH BLD: ABNORMAL
PLATELET # BLD AUTO: 518 K/UL — HIGH (ref 150–400)
POIKILOCYTOSIS BLD QL AUTO: SLIGHT — SIGNIFICANT CHANGE UP
POLYCHROMASIA BLD QL SMEAR: SLIGHT — SIGNIFICANT CHANGE UP
POTASSIUM SERPL-MCNC: 3.8 MMOL/L — SIGNIFICANT CHANGE UP (ref 3.5–5.3)
POTASSIUM SERPL-SCNC: 3.8 MMOL/L — SIGNIFICANT CHANGE UP (ref 3.5–5.3)
PROT SERPL-MCNC: 7.6 G/DL — SIGNIFICANT CHANGE UP (ref 6–8.3)
PROTHROM AB SERPL-ACNC: 11.9 SEC — SIGNIFICANT CHANGE UP (ref 10.5–13.4)
RBC # BLD: 3.9 M/UL — SIGNIFICANT CHANGE UP (ref 3.8–5.2)
RBC # FLD: 19.4 % — HIGH (ref 10.3–14.5)
RBC BLD AUTO: ABNORMAL
RH IG SCN BLD-IMP: POSITIVE — SIGNIFICANT CHANGE UP
SODIUM SERPL-SCNC: 139 MMOL/L — SIGNIFICANT CHANGE UP (ref 135–145)
TARGETS BLD QL SMEAR: SLIGHT — SIGNIFICANT CHANGE UP
WBC # BLD: 3.73 K/UL — LOW (ref 3.8–10.5)
WBC # FLD AUTO: 3.73 K/UL — LOW (ref 3.8–10.5)

## 2022-07-20 PROCEDURE — 85610 PROTHROMBIN TIME: CPT

## 2022-07-20 PROCEDURE — P9016: CPT

## 2022-07-20 PROCEDURE — 99285 EMERGENCY DEPT VISIT HI MDM: CPT

## 2022-07-20 PROCEDURE — 93010 ELECTROCARDIOGRAM REPORT: CPT

## 2022-07-20 PROCEDURE — 80053 COMPREHEN METABOLIC PANEL: CPT

## 2022-07-20 PROCEDURE — 85025 COMPLETE CBC W/AUTO DIFF WBC: CPT

## 2022-07-20 PROCEDURE — 85730 THROMBOPLASTIN TIME PARTIAL: CPT

## 2022-07-20 PROCEDURE — 86923 COMPATIBILITY TEST ELECTRIC: CPT

## 2022-07-20 PROCEDURE — 86850 RBC ANTIBODY SCREEN: CPT

## 2022-07-20 PROCEDURE — 36430 TRANSFUSION BLD/BLD COMPNT: CPT

## 2022-07-20 PROCEDURE — 86900 BLOOD TYPING SEROLOGIC ABO: CPT

## 2022-07-20 PROCEDURE — 99285 EMERGENCY DEPT VISIT HI MDM: CPT | Mod: 25

## 2022-07-20 PROCEDURE — 84702 CHORIONIC GONADOTROPIN TEST: CPT

## 2022-07-20 PROCEDURE — 86901 BLOOD TYPING SEROLOGIC RH(D): CPT

## 2022-07-20 PROCEDURE — 93005 ELECTROCARDIOGRAM TRACING: CPT

## 2022-07-20 NOTE — ED PROVIDER NOTE - PROGRESS NOTE DETAILS
Hgb 7.5.  Given patient symptomatic with generalized weakness and tachycardia.  Will transfuse 1 unit and dc home with outpatient OBGYN and hematology follow up.  -Misha Castro PA-C

## 2022-07-20 NOTE — ED ADULT NURSE NOTE - OBJECTIVE STATEMENT
40 yo presents to the ED from home. A&Ox4, ambulatory c/o vaginal bleeding since Friday, soaking 3-4 pads. pt is currently on menstrual cycle. pt reports history of fibroids and heavy vaginal bleeding. pt had procedure in the past. pt reports SOB, dizzy and weakness x 3 days. pt denies HA, CP. EKG done at bedside. pt reports history of anemia, requiring blood transfusions and iron transfusion. pt is on an iron pin daily but denies being consistently compliant. 20G LAC. Patient undressed and placed into gown, call bell in hand and side rails up for safety. warm blanket provided, vital signs stable, pt in no acute distress.

## 2022-07-20 NOTE — ED ADULT NURSE REASSESSMENT NOTE - NS ED NURSE REASSESS COMMENT FT1
Report taken RN Geraldine, pt v/s stable, resting comfortably.  Blood transfusion running 150ml/hr. CAll bell within reach.

## 2022-07-20 NOTE — ED ADULT NURSE NOTE - NSFALLRSKASSESSTYPE_ED_ALL_ED
Health Maintenance Summary     Topic Due On Due Status Completed On Postpone Until Reason    Pap Smear - Cervical Cancer Screening  Jan 1, 2020 Not Due Jan 1, 2015      Immunization - TDAP Pregnancy  Hidden       IMMUNIZATION - DTaP/Tdap/Td Jan 2, 2027 Not Due Jan 2, 2017      Immunization-Influenza Sep 1, 2017 Postponed Nov 20, 2013 Apr 1, 2018 Patient Refused          Patient is up to date, no discussion needed .     Initial (On Arrival)

## 2022-07-20 NOTE — ED ADULT NURSE REASSESSMENT NOTE - NS ED NURSE REASSESS COMMENT FT1
1 unit PRBC given. Consent in chart. Risks and benefits explained to patient. Patient verbalized understanding of risks and benefits. Patient aware of possible side effects. Vital signs stable. Second RN at bedside for confirmation.

## 2022-07-20 NOTE — ED PROVIDER NOTE - NS ED ATTENDING STATEMENT MOD
This was a shared visit with the DAYANARA. I reviewed and verified the documentation and independently performed the documented:

## 2022-07-20 NOTE — ED PROVIDER NOTE - NSFOLLOWUPINSTRUCTIONS_ED_ALL_ED_FT
Stay well hydrated.  Follow-up with your OBGYN within 1 week. Continue iron supplementation.   Bring a copy of your results with you  Return to an ER for worsening symptoms or any other concerns.

## 2022-07-20 NOTE — ED PROVIDER NOTE - PATIENT PORTAL LINK FT
You can access the FollowMyHealth Patient Portal offered by [facility] by registering at the following website: [email or web address]. By joining [employer]’s FollowMyHealth portal, you will also be able to view your health information using other applications (apps) compatible with our system.

## 2022-07-20 NOTE — ED PROVIDER NOTE - ATTENDING APP SHARED VISIT CONTRIBUTION OF CARE
attending Bassam: 41yF h/o fibroid uterus, anemia, menorrhagia p/w generalized weakness, PICA (eating ice chips). Similar symptoms to when she needed prior transfusion. On day 5 of her usual 7 day menses with heavy bleeding and passage of clos. Had prior myomectomy, currently on OCPs. Exam as above. Will obtain labs including type and screen, transfuse PRN

## 2022-07-20 NOTE — ED PROVIDER NOTE - OBJECTIVE STATEMENT
42 yo female with PMHx of fibroid uterus, anemia, menorrhagia p/w generalized weakness.  Patient reports that she is currently on day 5 of her menses. Patient always has heavy menses and this month it is similar.  over the past 2-3 days, patient has been feeling generalized weakness, fatigue and lightheadedness.  No LOC. Also eating a lot more ice chips. Patient states that she felt similar symptoms in 2019 when she was found to be anemic requiring blood transfusion.  Patient has been following up with an OBGYN at [facility] and had a myomectomy in 2021.  Was previously on OCPs, but not currently.  Was noncompliant with iron supplementation at home, but recently restarted.  Denies fevers, Cp, SOB, abd pain, NVD, dysuria.

## 2022-07-20 NOTE — ED PROVIDER NOTE - PHYSICAL EXAMINATION
Gen: AAO x 3, NAD  Skin: No rashes or lesions  HEENT: NC/AT, PERRLA, EOMI, MMM  Resp: unlabored CTAB  Cardiac: tachycardic, s1s2, no murmurs, rubs or gallops  GI: ND, +BS, Soft, NT  Ext: no pedal edema, FROM in all extremities  Neuro: no focal deficits

## 2022-07-28 ENCOUNTER — OUTPATIENT (OUTPATIENT)
Dept: OUTPATIENT SERVICES | Facility: HOSPITAL | Age: 42
LOS: 1 days | Discharge: HOME | End: 2022-07-28

## 2022-07-28 DIAGNOSIS — D64.9 ANEMIA, UNSPECIFIED: ICD-10-CM

## 2022-07-29 ENCOUNTER — RESULT REVIEW (OUTPATIENT)
Age: 42
End: 2022-07-29

## 2022-07-29 ENCOUNTER — APPOINTMENT (OUTPATIENT)
Dept: HEMATOLOGY ONCOLOGY | Facility: CLINIC | Age: 42
End: 2022-07-29

## 2022-07-29 VITALS
BODY MASS INDEX: 25.33 KG/M2 | HEIGHT: 59 IN | TEMPERATURE: 97.3 F | WEIGHT: 125.66 LBS | RESPIRATION RATE: 16 BRPM | OXYGEN SATURATION: 100 % | HEART RATE: 80 BPM | SYSTOLIC BLOOD PRESSURE: 125 MMHG | DIASTOLIC BLOOD PRESSURE: 85 MMHG

## 2022-07-29 DIAGNOSIS — Z78.9 OTHER SPECIFIED HEALTH STATUS: ICD-10-CM

## 2022-07-29 LAB
BASOPHILS # BLD AUTO: 0.01 K/UL — SIGNIFICANT CHANGE UP (ref 0–0.2)
BASOPHILS NFR BLD AUTO: 0.3 % — SIGNIFICANT CHANGE UP (ref 0–2)
EOSINOPHIL # BLD AUTO: 0.07 K/UL — SIGNIFICANT CHANGE UP (ref 0–0.5)
EOSINOPHIL NFR BLD AUTO: 2 % — SIGNIFICANT CHANGE UP (ref 0–6)
HCT VFR BLD CALC: 31.2 % — LOW (ref 34.5–45)
HGB BLD-MCNC: 9.6 G/DL — LOW (ref 11.5–15.5)
IMM GRANULOCYTES NFR BLD AUTO: 0 % — SIGNIFICANT CHANGE UP (ref 0–1.5)
LYMPHOCYTES # BLD AUTO: 1.46 K/UL — SIGNIFICANT CHANGE UP (ref 1–3.3)
LYMPHOCYTES # BLD AUTO: 40.9 % — SIGNIFICANT CHANGE UP (ref 13–44)
MCHC RBC-ENTMCNC: 21.8 PG — LOW (ref 27–34)
MCHC RBC-ENTMCNC: 30.8 G/DL — LOW (ref 32–36)
MCV RBC AUTO: 70.7 FL — LOW (ref 80–100)
MONOCYTES # BLD AUTO: 0.46 K/UL — SIGNIFICANT CHANGE UP (ref 0–0.9)
MONOCYTES NFR BLD AUTO: 12.9 % — SIGNIFICANT CHANGE UP (ref 2–14)
NEUTROPHILS # BLD AUTO: 1.57 K/UL — LOW (ref 1.8–7.4)
NEUTROPHILS NFR BLD AUTO: 43.9 % — SIGNIFICANT CHANGE UP (ref 43–77)
NRBC # BLD: 0 /100 WBCS — SIGNIFICANT CHANGE UP (ref 0–0)
PLATELET # BLD AUTO: 356 K/UL — SIGNIFICANT CHANGE UP (ref 150–400)
RBC # BLD: 4.41 M/UL — SIGNIFICANT CHANGE UP (ref 3.8–5.2)
RBC # FLD: 26 % — HIGH (ref 10.3–14.5)
WBC # BLD: 3.57 K/UL — LOW (ref 3.8–10.5)
WBC # FLD AUTO: 3.57 K/UL — LOW (ref 3.8–10.5)

## 2022-07-29 PROCEDURE — 99205 OFFICE O/P NEW HI 60 MIN: CPT

## 2022-07-29 NOTE — HISTORY OF PRESENT ILLNESS
[de-identified] : 41 year-old Ms. MANZANARES is seen in consult for anemia. Patient presented to [facility] on 7/20/2022 with symptomatic anemia. She received one unit of PRBC and was discharged with PO iron and Hematology follow up. She has fibroid and has heavy periods. She is seeing GYN. Patient has received transfusion and IV iron in the past (2020). Today, she feels well, has no symptoms.

## 2022-08-01 LAB
EPO SERPL-MCNC: 28.8 MIU/ML
FERRITIN SERPL-MCNC: 17 NG/ML
FOLATE SERPL-MCNC: 15.4 NG/ML
IRON SATN MFR SERPL: 8 %
IRON SERPL-MCNC: 27 UG/DL
TIBC SERPL-MCNC: 329 UG/DL
TSH SERPL-ACNC: 1.63 UIU/ML
UIBC SERPL-MCNC: 303 UG/DL
VIT B12 SERPL-MCNC: 698 PG/ML

## 2022-08-04 LAB
ALBUMIN MFR SERPL ELPH: 55.4 %
ALBUMIN SERPL-MCNC: 3.8 G/DL
ALBUMIN/GLOB SERPL: 1.2 RATIO
ALPHA1 GLOB MFR SERPL ELPH: 3.8 %
ALPHA1 GLOB SERPL ELPH-MCNC: 0.3 G/DL
ALPHA2 GLOB MFR SERPL ELPH: 9 %
ALPHA2 GLOB SERPL ELPH-MCNC: 0.6 G/DL
B-GLOBULIN MFR SERPL ELPH: 10.6 %
B-GLOBULIN SERPL ELPH-MCNC: 0.7 G/DL
DEPRECATED KAPPA LC FREE/LAMBDA SER: 1.31 RATIO
GAMMA GLOB FLD ELPH-MCNC: 1.5 G/DL
GAMMA GLOB MFR SERPL ELPH: 21.2 %
IGA SER QL IEP: 141 MG/DL
IGG SER QL IEP: 1388 MG/DL
IGM SER QL IEP: 207 MG/DL
INTERPRETATION SERPL IEP-IMP: NORMAL
KAPPA LC CSF-MCNC: 1.29 MG/DL
KAPPA LC SERPL-MCNC: 1.69 MG/DL
M PROTEIN SPEC IFE-MCNC: NORMAL
PROT SERPL-MCNC: 6.9 G/DL
PROT SERPL-MCNC: 6.9 G/DL

## 2022-08-15 ENCOUNTER — APPOINTMENT (OUTPATIENT)
Dept: INFUSION THERAPY | Facility: HOSPITAL | Age: 42
End: 2022-08-15

## 2022-08-16 DIAGNOSIS — D50.9 IRON DEFICIENCY ANEMIA, UNSPECIFIED: ICD-10-CM

## 2022-08-26 ENCOUNTER — APPOINTMENT (OUTPATIENT)
Dept: INFUSION THERAPY | Facility: HOSPITAL | Age: 42
End: 2022-08-26

## 2022-08-29 ENCOUNTER — NON-APPOINTMENT (OUTPATIENT)
Age: 42
End: 2022-08-29

## 2022-08-31 ENCOUNTER — APPOINTMENT (OUTPATIENT)
Dept: INFUSION THERAPY | Facility: HOSPITAL | Age: 42
End: 2022-08-31

## 2022-09-09 ENCOUNTER — APPOINTMENT (OUTPATIENT)
Dept: INFUSION THERAPY | Facility: HOSPITAL | Age: 42
End: 2022-09-09

## 2022-09-19 NOTE — PATIENT PROFILE ADULT - FUNCTIONAL SCREEN CURRENT LEVEL: SWALLOWING (IF SCORE 2 OR MORE FOR ANY ITEM, CONSULT REHAB SERVICES), MLM)
"Patient calling with concerns for rash.  Patient states she is concerned she has a parasite.   Describes little black and red lines all over the place.  Feels like they're in her elbows, wrinkles, groin, etc.  Painful.  Feels like things are crawling out of her skin.    Psychiatrist is currently out of office, recently increased dose of Abilify.    Triage RN consoled patient, offered virtual appointment on Wednesday for Dr. Srivastava to assess.  Patient satisfied with plan.    Reason for Disposition    Patient wants to be seen    Answer Assessment - Initial Assessment Questions  1. APPEARANCE of RASH: \"Describe the rash.\" (e.g., spots, blisters, raised areas, skin peeling, scaly)      All over body  2. SIZE: \"How big are the spots?\" (e.g., tip of pen, eraser, coin; inches, centimeters)      Lines going up and down her body  3. LOCATION: \"Where is the rash located?\"      All over  4. COLOR: \"What color is the rash?\" (Note: It is difficult to assess rash color in people with darker-colored skin. When this situation occurs, simply ask the caller to describe what they see.)      Red, black, purple  5. ONSET: \"When did the rash begin?\"      Has had for a long time, but feels it is getting worse  6. FEVER: \"Do you have a fever?\" If Yes, ask: \"What is your temperature, how was it measured, and when did it start?\"      NA  7. ITCHING: \"Does the rash itch?\" If Yes, ask: \"How bad is the itch?\" (Scale 1-10; or mild, moderate, severe)      moderate  8. CAUSE: \"What do you think is causing the rash?\"      Parasites per patient  9. MEDICINE FACTORS: \"Have you started any new medicines within the last 2 weeks?\" (e.g., antibiotics)       no  10. OTHER SYMPTOMS: \"Do you have any other symptoms?\" (e.g., dizziness, headache, sore throat, joint pain)        no  11. PREGNANCY: \"Is there any chance you are pregnant?\" \"When was your last menstrual period?\"        NA    Protocols used: RASH OR REDNESS - WIDESPREAD-A-OH      "
0 = swallows foods/liquids without difficulty

## 2022-09-21 NOTE — ED ADULT NURSE NOTE - NS ED NURSE DISCH DISPOSITION
Hold Asprin restart when cleared by podiatrist, usually within a couple days    Check with endocrine doctor regarding insulin pump during surgery    Take all evening medications as usual.   Take celexa in the morning with a small amount of water, or hold until after surgery.       Albuterol in AM before surgery     Admitted

## 2022-09-27 ENCOUNTER — OUTPATIENT (OUTPATIENT)
Dept: OUTPATIENT SERVICES | Facility: HOSPITAL | Age: 42
LOS: 1 days | Discharge: HOME | End: 2022-09-27

## 2022-09-27 DIAGNOSIS — D64.9 ANEMIA, UNSPECIFIED: ICD-10-CM

## 2022-10-13 ENCOUNTER — APPOINTMENT (OUTPATIENT)
Dept: INFUSION THERAPY | Facility: HOSPITAL | Age: 42
End: 2022-10-13

## 2022-10-17 ENCOUNTER — APPOINTMENT (OUTPATIENT)
Dept: HEMATOLOGY ONCOLOGY | Facility: CLINIC | Age: 42
End: 2022-10-17

## 2023-01-06 ENCOUNTER — APPOINTMENT (OUTPATIENT)
Dept: OBGYN | Facility: CLINIC | Age: 43
End: 2023-01-06

## 2023-01-24 ENCOUNTER — OUTPATIENT (OUTPATIENT)
Dept: OUTPATIENT SERVICES | Facility: HOSPITAL | Age: 43
LOS: 1 days | Discharge: HOME | End: 2023-01-24

## 2023-01-24 DIAGNOSIS — D64.9 ANEMIA, UNSPECIFIED: ICD-10-CM

## 2023-01-25 ENCOUNTER — RESULT REVIEW (OUTPATIENT)
Age: 43
End: 2023-01-25

## 2023-01-25 ENCOUNTER — APPOINTMENT (OUTPATIENT)
Dept: HEMATOLOGY ONCOLOGY | Facility: CLINIC | Age: 43
End: 2023-01-25
Payer: MEDICAID

## 2023-01-25 VITALS
BODY MASS INDEX: 26.67 KG/M2 | WEIGHT: 132.28 LBS | TEMPERATURE: 98.8 F | RESPIRATION RATE: 16 BRPM | SYSTOLIC BLOOD PRESSURE: 135 MMHG | DIASTOLIC BLOOD PRESSURE: 90 MMHG | HEIGHT: 58.98 IN | HEART RATE: 88 BPM | OXYGEN SATURATION: 99 %

## 2023-01-25 LAB
BASOPHILS # BLD AUTO: 0.03 K/UL — SIGNIFICANT CHANGE UP (ref 0–0.2)
BASOPHILS NFR BLD AUTO: 1 % — SIGNIFICANT CHANGE UP (ref 0–2)
EOSINOPHIL # BLD AUTO: 0.08 K/UL — SIGNIFICANT CHANGE UP (ref 0–0.5)
EOSINOPHIL NFR BLD AUTO: 2.6 % — SIGNIFICANT CHANGE UP (ref 0–6)
HCT VFR BLD CALC: 26.3 % — LOW (ref 34.5–45)
HGB BLD-MCNC: 8.4 G/DL — LOW (ref 11.5–15.5)
IMM GRANULOCYTES NFR BLD AUTO: 0 % — SIGNIFICANT CHANGE UP (ref 0–0.9)
LYMPHOCYTES # BLD AUTO: 1.4 K/UL — SIGNIFICANT CHANGE UP (ref 1–3.3)
LYMPHOCYTES # BLD AUTO: 45.8 % — HIGH (ref 13–44)
MCHC RBC-ENTMCNC: 24.2 PG — LOW (ref 27–34)
MCHC RBC-ENTMCNC: 31.9 G/DL — LOW (ref 32–36)
MCV RBC AUTO: 75.8 FL — LOW (ref 80–100)
MONOCYTES # BLD AUTO: 0.36 K/UL — SIGNIFICANT CHANGE UP (ref 0–0.9)
MONOCYTES NFR BLD AUTO: 11.8 % — SIGNIFICANT CHANGE UP (ref 2–14)
NEUTROPHILS # BLD AUTO: 1.19 K/UL — LOW (ref 1.8–7.4)
NEUTROPHILS NFR BLD AUTO: 38.8 % — LOW (ref 43–77)
NRBC # BLD: 0 /100 WBCS — SIGNIFICANT CHANGE UP (ref 0–0)
PLATELET # BLD AUTO: 384 K/UL — SIGNIFICANT CHANGE UP (ref 150–400)
RBC # BLD: 3.47 M/UL — LOW (ref 3.8–5.2)
RBC # FLD: 16.2 % — HIGH (ref 10.3–14.5)
WBC # BLD: 3.06 K/UL — LOW (ref 3.8–10.5)
WBC # FLD AUTO: 3.06 K/UL — LOW (ref 3.8–10.5)

## 2023-01-25 PROCEDURE — 99213 OFFICE O/P EST LOW 20 MIN: CPT

## 2023-01-25 NOTE — RESULTS/DATA
[FreeTextEntry1] : 1/25/2023\par CBC from previous visit in July 2022:\par Hgb 9.6, MCV 70.7, ferritin 17, iron sat 8%\par \par 7/29/2022\par Available scanned labs reviewed. Microcytic anemia noted.

## 2023-01-25 NOTE — HISTORY OF PRESENT ILLNESS
[de-identified] : 42 year-old Ms. MANZANARES is seen in consult for anemia. Patient presented to [facility] on 7/20/2022 with symptomatic anemia. She received one unit of PRBC and was discharged with PO iron and Hematology follow up. She has fibroid and has heavy periods. She is seeing GYN. Patient has received transfusion and IV iron in the past (2020). Today, she feels well, has no symptoms.  [de-identified] : 7/29/2023\par Initial visit\par \par 1/25/2023\par Patient seen in follow up for REAL. She was scheduled for Venofer x4 based on her iron requirements from her previous and first visit with us in July 2022 but only received two doses due to personal scheduling conflict. Last Venofer treatment was in September 2022. She is scheduled for a myomectomy with Dr. Wahl from [facility] on Feb 1st. As per pt, last Hgb was 9.8 on 01/18/2023, surgeon is asking that she receive at least 1 Venofer infusion prior to procedure (ideal Hgb > 10). Otherwise in good health, no complaints. Currently adhering to and tolerating PO iron supplements

## 2023-01-26 LAB
FERRITIN SERPL-MCNC: 5 NG/ML
IRON SATN MFR SERPL: 4 %
IRON SERPL-MCNC: 15 UG/DL
TIBC SERPL-MCNC: 351 UG/DL
UIBC SERPL-MCNC: 335 UG/DL

## 2023-01-27 ENCOUNTER — APPOINTMENT (OUTPATIENT)
Dept: INFUSION THERAPY | Facility: HOSPITAL | Age: 43
End: 2023-01-27

## 2023-01-30 ENCOUNTER — APPOINTMENT (OUTPATIENT)
Dept: INFUSION THERAPY | Facility: HOSPITAL | Age: 43
End: 2023-01-30

## 2023-01-30 DIAGNOSIS — D50.9 IRON DEFICIENCY ANEMIA, UNSPECIFIED: ICD-10-CM

## 2023-02-03 ENCOUNTER — APPOINTMENT (OUTPATIENT)
Dept: INFUSION THERAPY | Facility: HOSPITAL | Age: 43
End: 2023-02-03

## 2023-02-06 ENCOUNTER — APPOINTMENT (OUTPATIENT)
Dept: INFUSION THERAPY | Facility: HOSPITAL | Age: 43
End: 2023-02-06

## 2023-02-10 ENCOUNTER — APPOINTMENT (OUTPATIENT)
Dept: INFUSION THERAPY | Facility: HOSPITAL | Age: 43
End: 2023-02-10

## 2023-02-20 ENCOUNTER — APPOINTMENT (OUTPATIENT)
Dept: INFUSION THERAPY | Facility: HOSPITAL | Age: 43
End: 2023-02-20

## 2023-02-27 ENCOUNTER — APPOINTMENT (OUTPATIENT)
Dept: INFUSION THERAPY | Facility: HOSPITAL | Age: 43
End: 2023-02-27

## 2023-03-03 ENCOUNTER — APPOINTMENT (OUTPATIENT)
Dept: INFUSION THERAPY | Facility: HOSPITAL | Age: 43
End: 2023-03-03

## 2023-03-13 ENCOUNTER — RESULT REVIEW (OUTPATIENT)
Age: 43
End: 2023-03-13

## 2023-03-13 ENCOUNTER — APPOINTMENT (OUTPATIENT)
Dept: HEMATOLOGY ONCOLOGY | Facility: CLINIC | Age: 43
End: 2023-03-13

## 2023-03-13 ENCOUNTER — LABORATORY RESULT (OUTPATIENT)
Age: 43
End: 2023-03-13

## 2023-03-13 LAB
BASOPHILS # BLD AUTO: 0 K/UL — SIGNIFICANT CHANGE UP (ref 0–0.2)
BASOPHILS NFR BLD AUTO: 0 % — SIGNIFICANT CHANGE UP (ref 0–2)
DACRYOCYTES BLD QL SMEAR: SLIGHT — SIGNIFICANT CHANGE UP
ELLIPTOCYTES BLD QL SMEAR: SLIGHT — SIGNIFICANT CHANGE UP
EOSINOPHIL # BLD AUTO: 0.07 K/UL — SIGNIFICANT CHANGE UP (ref 0–0.5)
EOSINOPHIL NFR BLD AUTO: 3 % — SIGNIFICANT CHANGE UP (ref 0–6)
HCT VFR BLD CALC: 35.2 % — SIGNIFICANT CHANGE UP (ref 34.5–45)
HGB BLD-MCNC: 11.6 G/DL — SIGNIFICANT CHANGE UP (ref 11.5–15.5)
LYMPHOCYTES # BLD AUTO: 0.97 K/UL — LOW (ref 1–3.3)
LYMPHOCYTES # BLD AUTO: 42 % — SIGNIFICANT CHANGE UP (ref 13–44)
MCHC RBC-ENTMCNC: 26.4 PG — LOW (ref 27–34)
MCHC RBC-ENTMCNC: 33 G/DL — SIGNIFICANT CHANGE UP (ref 32–36)
MCV RBC AUTO: 80.2 FL — SIGNIFICANT CHANGE UP (ref 80–100)
MONOCYTES # BLD AUTO: 0.21 K/UL — SIGNIFICANT CHANGE UP (ref 0–0.9)
MONOCYTES NFR BLD AUTO: 9 % — SIGNIFICANT CHANGE UP (ref 2–14)
NEUTROPHILS # BLD AUTO: 1.06 K/UL — LOW (ref 1.8–7.4)
NEUTROPHILS NFR BLD AUTO: 46 % — SIGNIFICANT CHANGE UP (ref 43–77)
NRBC # BLD: 0 /100 — SIGNIFICANT CHANGE UP (ref 0–0)
NRBC # BLD: SIGNIFICANT CHANGE UP /100 WBCS (ref 0–0)
PLAT MORPH BLD: NORMAL — SIGNIFICANT CHANGE UP
PLATELET # BLD AUTO: 290 K/UL — SIGNIFICANT CHANGE UP (ref 150–400)
POIKILOCYTOSIS BLD QL AUTO: SLIGHT — SIGNIFICANT CHANGE UP
RBC # BLD: 4.39 M/UL — SIGNIFICANT CHANGE UP (ref 3.8–5.2)
RBC # FLD: 19.1 % — HIGH (ref 10.3–14.5)
RBC BLD AUTO: ABNORMAL
TARGETS BLD QL SMEAR: SLIGHT — SIGNIFICANT CHANGE UP
WBC # BLD: 2.3 K/UL — LOW (ref 3.8–10.5)
WBC # FLD AUTO: 2.3 K/UL — LOW (ref 3.8–10.5)

## 2023-03-15 ENCOUNTER — APPOINTMENT (OUTPATIENT)
Dept: HEMATOLOGY ONCOLOGY | Facility: CLINIC | Age: 43
End: 2023-03-15
Payer: MEDICAID

## 2023-03-15 DIAGNOSIS — N92.0 EXCESSIVE AND FREQUENT MENSTRUATION WITH REGULAR CYCLE: ICD-10-CM

## 2023-03-15 DIAGNOSIS — D64.9 ANEMIA, UNSPECIFIED: ICD-10-CM

## 2023-03-15 DIAGNOSIS — R71.8 OTHER ABNORMALITY OF RED BLOOD CELLS: ICD-10-CM

## 2023-03-15 PROCEDURE — 99213 OFFICE O/P EST LOW 20 MIN: CPT

## 2023-03-15 NOTE — RESULTS/DATA
[FreeTextEntry1] : 3/15/2023\par Labs from 3/13/23\par Hgb 11.5\par Ferritin 75\par WBC 2.3, ANC 1.06\par \par \par 1/25/2023\par CBC from previous visit in July 2022:\par Hgb 9.6, MCV 70.7, ferritin 17, iron sat 8%\par \par \par 7/29/2022\par Available scanned labs reviewed. Microcytic anemia noted.

## 2023-03-15 NOTE — HISTORY OF PRESENT ILLNESS
[de-identified] : 42 year-old Ms. MANZANARES is seen in consult for anemia. Patient presented to [facility] on 7/20/2022 with symptomatic anemia. She received one unit of PRBC and was discharged with PO iron and Hematology follow up. She has fibroid and has heavy periods. She is seeing GYN. Patient has received transfusion and IV iron in the past (2020). Today, she feels well, has no symptoms.  [de-identified] : 7/29/2023\par Initial visit\par \par 1/25/2023\par Patient seen in follow up for REAL. She was scheduled for Venofer x4 based on her iron requirements from her previous and first visit with us in July 2022 but only received two doses due to personal scheduling conflict. Last Venofer treatment was in September 2022. She is scheduled for a myomectomy with Dr. Wahl from [facility] on Feb 1st. As per pt, last Hgb was 9.8 on 01/18/2023, surgeon is asking that she receive at least 1 Venofer infusion prior to procedure (ideal Hgb > 10). Otherwise in good health, no complaints. Currently adhering to and tolerating PO iron supplements.\par \par 3/15/2023\par Patient returns for a follow up, She has had the surgery done on 2/1/23. She received 2/4 scheduled IV iron before surgery. Her H/H and ferritin two days ago (3/13) is 11.5 and 75. She endorses no fatigue, weakness. She has no complaints. She is happy that she will not need any further treatment. She asked about the long standing low neutrophils, which is likely DEBBIE or CN.

## 2023-03-15 NOTE — ASSESSMENT
[FreeTextEntry1] : 41 year-old Ms. MANZANARES is seen in consult for anemia from menorrhagia due to fibroid uterus. Patient has received blood transfusion and IV iron in the past. Patient is s/p myomectomy on 02/01/2022 with [facility] group. Completed only 2/4 requested Venofer treatments prior to surgery.  Her Hgb is now 11.5 and ferritin 75. No further treatment is needed at this time. \par \par \par [] Anemia,  iron deficiency secondary to menorrhagia \par - Continue PO iron\par - Received 2/4 scheduled Venofer treatments (last dose in September 2022)\par - Scheduled for myomectomy on 02/01/2023 with Dr. Wahl \par - Scheduled Venofer x 2 prior to surgery (1/27/23, 1/30/23), received \par - Post surgery count on 3/13/2023 is WNL (Hgb 11.5, Ferritin 75)\par - No further f/u is required\par - RTC if needed\par \par \par [] Leukopenia, Neutropenia, stable \par - Known to patient for many years\par - Likely DEBBIE vs CN \par - No intervention needed \par \par \par Dr. Britta Wahl (OBGYN)\par [facility] \par [address]\par [place], [place], [address]\par [phone]\par \par

## 2023-03-17 ENCOUNTER — APPOINTMENT (OUTPATIENT)
Dept: INFUSION THERAPY | Facility: HOSPITAL | Age: 43
End: 2023-03-17

## 2023-03-20 ENCOUNTER — APPOINTMENT (OUTPATIENT)
Dept: INFUSION THERAPY | Facility: HOSPITAL | Age: 43
End: 2023-03-20

## 2024-08-05 NOTE — ASSESSMENT
[FreeTextEntry1] : 41 year-old Ms. MANZANARES is seen in consult for anemia from menorrhagia due to fibroid uterus. Patient has received blood transfusion and IV iron in the past. Will proceed with the following: \par \par \par [] Anemia, likely iron deficiency secondary to menorrhagia \par - Patient is on PO iron\par - Complete Anemia w/u \par - Schedule for Parenteral nutrient if indicated by lab data \par - RTC in 3 months
No